# Patient Record
Sex: MALE | Race: BLACK OR AFRICAN AMERICAN | URBAN - METROPOLITAN AREA
[De-identification: names, ages, dates, MRNs, and addresses within clinical notes are randomized per-mention and may not be internally consistent; named-entity substitution may affect disease eponyms.]

---

## 2018-05-30 ENCOUNTER — OFFICE VISIT (OUTPATIENT)
Dept: FAMILY MEDICINE CLINIC | Age: 48
End: 2018-05-30

## 2018-05-30 VITALS
WEIGHT: 187.6 LBS | TEMPERATURE: 98.3 F | SYSTOLIC BLOOD PRESSURE: 130 MMHG | HEART RATE: 82 BPM | BODY MASS INDEX: 26.86 KG/M2 | OXYGEN SATURATION: 98 % | RESPIRATION RATE: 18 BRPM | HEIGHT: 70 IN | DIASTOLIC BLOOD PRESSURE: 86 MMHG

## 2018-05-30 DIAGNOSIS — E66.3 OVERWEIGHT: ICD-10-CM

## 2018-05-30 DIAGNOSIS — Z76.89 ENCOUNTER TO ESTABLISH CARE: Primary | ICD-10-CM

## 2018-05-30 DIAGNOSIS — Z00.00 ROUTINE GENERAL MEDICAL EXAMINATION AT HEALTH CARE FACILITY: ICD-10-CM

## 2018-05-30 DIAGNOSIS — R73.03 PREDIABETES: ICD-10-CM

## 2018-05-30 DIAGNOSIS — Z23 ENCOUNTER FOR IMMUNIZATION: ICD-10-CM

## 2018-05-30 NOTE — PROGRESS NOTES
Chief Complaint   Patient presents with    New Patient     1. Have you been to the ER, urgent care clinic since your last visit? Hospitalized since your last visit? No    2. Have you seen or consulted any other health care providers outside of the [address] [place] [address] since your last visit? Include any pap smears or colon screening. No    Patient received TDAP vaccine today in right deltoid with no adverse reactions.

## 2018-05-30 NOTE — PROGRESS NOTES
[facility] Note      Subjective:     Chief Complaint   Patient presents with    New Patient     Bridget Bahena is a 50y.o. year old male who presents for evaluation of the following:    Establishment of Care:  Previous PCP: NP [facility] [place]:   Dentist- none  Optho- none    PMH:   Prediabetes: A1C 5.7% in 2014  Patein was unaware of this lab abnormality. Mother with diabetes    Acute Concerns:  None    Social:   Works Shredded, shred personal documents  Lives alone  Children, 7 who live locally and in good relationship with them  Mood is content    Health Maintenance:   Diet: unrestricted  Activity: Gym once per week, for 1.5 hours, weight training, stair climber    TDaP: Not on file. Due  Influenza: Not due  Pneumovax: Not due   Prevnar @65: Not due  Shingles @60: Not due    Colonoscopy @50: No family history, Not due. ASA @ 55f and 45m: Not taking  Dexa @65: Not due    HIV or other STD testing: Due  Domestic Violence Screen: Negative  Depression Screen: Denies depression or anhedonia        reports that he currently engages in sexual activity and has had female partners. Prostate Check: Last 2014  No Fam Hx of prostate cancer  Denies groin pain/pulling, penile bleeding/discharge, dysuria, nighttime urination. Smoker? No      Review of Systems   Pertinent positives and negative per HPI. All other systems  reviewed are negative for a Comprehensive ROS (10+). History reviewed. No pertinent past medical history. Social History     Social History    Marital status:      Spouse name: N/A    Number of children: N/A    Years of education: N/A     Occupational History    Not on file.      Social History Main Topics    Smoking status: Never Smoker    Smokeless tobacco: Never Used    Alcohol use Yes      Comment: socially    Drug use: No    Sexual activity: Yes     Partners: Female     Other Topics Concern    Not on file     Social History Narrative No current outpatient prescriptions on file. No current facility-administered medications for this visit. Objective:     Vitals:    05/30/18 0823   BP: 130/86   Pulse: 82   Resp: 18   Temp: 98.3 °F (36.8 °C)   TempSrc: Oral   SpO2: 98%   Weight: 187 lb 9.6 oz (85.1 kg)   Height: 5' 10\" (1.778 m)       Physical Examination:  General: Alert, cooperative, no distress, appears stated age. Eyes: Conjunctivae clear. PERRL, EOMs intact. Ears: Normal external ear canals both ears. Nose: Nares normal. Septum midline. Mucosa normal. No drainage or sinus tenderness. Mouth/Throat: Lips, mucosa, and tongue normal. Teeth and gums normal.  Neck: Supple, symmetrical, trachea midline, no adenopathy. No thyroid enlargement/tenderness/nodules  Back: Symmetric, no curvature. ROM normal.   Lungs: Clear to auscultation bilaterally. Normal inspiratory and expiratory ratio. Heart: Regular rate and rhythm, S1, S2 normal, no murmur, click, rub or gallop. Abdomen: Soft, non-tender. Bowel sounds normal. Small umbilical hernia, fully reducible. Extremities: Extremities normal, atraumatic, no cyanosis or edema. Pulses: 2+ and symmetric all extremities. Skin: Skin color, texture, turgor normal. No rashes or lesions on exposed skin. Lymph nodes: Cervical, supraclavicular nodes normal.  Neurologic: CNII-XII intact. Strength 5/5 grossly. Sensation and reflexes normal throughout. No visits with results within 3 Month(s) from this visit.   Latest known visit with results is:    Office Visit on 02/10/2014   Component Date Value Ref Range Status    Glucose 02/10/2014 78  65 - 99 mg/dL Final    BUN 02/10/2014 12  6 - 24 mg/dL Final    Creatinine 02/10/2014 1.28* 0.76 - 1.27 mg/dL Final    GFR est non-AA 02/10/2014 68  >59 mL/min/1.73 Final    GFR est AA 02/10/2014 78  >59 mL/min/1.73 Final    BUN/Creatinine ratio 02/10/2014 9  9 - 20 Final    Sodium 02/10/2014 144  134 - 144 mmol/L Final    Potassium 02/10/2014 3.9  3.5 - 5.2 mmol/L Final    Chloride 02/10/2014 103  97 - 108 mmol/L Final    CO2 02/10/2014 22  19 - 28 mmol/L Final    Calcium 02/10/2014 10.1  8.7 - 10.2 mg/dL Final    Protein, total 02/10/2014 6.9  6.0 - 8.5 g/dL Final    Albumin 02/10/2014 4.5  3.5 - 5.5 g/dL Final    GLOBULIN, TOTAL 02/10/2014 2.4  1.5 - 4.5 g/dL Final    A-G Ratio 02/10/2014 1.9  1.1 - 2.5 Final    Bilirubin, total 02/10/2014 0.6  0.0 - 1.2 mg/dL Final    Alk. phosphatase 02/10/2014 52  39 - 117 IU/L Final    AST (SGOT) 02/10/2014 15  0 - 40 IU/L Final    ALT (SGPT) 02/10/2014 14  0 - 44 IU/L Final    Cholesterol, total 02/10/2014 164  100 - 199 mg/dL Final    Triglyceride 02/10/2014 66  0 - 149 mg/dL Final    HDL Cholesterol 02/10/2014 49  >39 mg/dL Final    Comment: According to ATP-III Guidelines, HDL-C >59 mg/dL is considered a                           negative risk factor for CHD.  VLDL, calculated 02/10/2014 13  5 - 40 mg/dL Final    LDL, calculated 02/10/2014 102* 0 - 99 mg/dL Final    Prostate Specific Ag 02/10/2014 0.9  0.0 - 4.0 ng/mL Final    Comment: Roche ECLIA methodology. According to the American Urological Association, Serum PSA should                           decrease and remain at undetectable levels after radical                           prostatectomy. The AUA defines biochemical recurrence as an initial                           PSA value 0.2 ng/mL or greater followed by a subsequent confirmatory                           PSA value 0.2 ng/mL or greater. Values obtained with different assay methods or kits cannot be used                           interchangeably. Results cannot be interpreted as absolute evidence                           of the presence or absence of malignant disease.     Hemoglobin A1c 02/10/2014 5.7* 4.8 - 5.6 % Final    Comment:          Increased risk for diabetes: 5.7 - 6.4                                    Diabetes: >6.4 Glycemic control for adults with diabetes: <7.0    INTERPRETATION 02/10/2014 Note   Final    Comment: Medical Director's Note: On November 12, 2013, the ACC/AHA released                           new guidelines on cholesterol treatment in adults ([address] et al.                           2013 ACC/AHA guideline on the treatment of blood cholesterol to reduce                           atherosclerotic cardiovascular risk in adults: a report of the                           [employer] of Cardiology/American Heart Association Task Force                           on Practice Guidelines. Circulation. 2013; 00:000-000.) The new                           guidelines do not make a recommendation for or against use of LDL-C or                           non-HDL-C targets, but instead identify patient groups who would                           benefit from statin therapy. Until a consensus has been reached, the                           suggestions in this report continue to reflect the ATP III guidelines.  PDF IMAGE 02/10/2014 . Final    Comment: Performed At: [address]  [id number]                           Cordell Garces MD                           [id number]                           Performed At: [address] [place] [address]  [id number]                           Halie Velazquez PhD                           [phone]           Assessment/ Plan:   Diagnoses and all orders for this visit:    1. Encounter to establish care    2.  Routine general medical examination at health care facility  -     LIPID PANEL  -     METABOLIC PANEL, COMPREHENSIVE  -     CBC WITH AUTOMATED DIFF  -     HIV 1/2 AG/AB, 4TH GENERATION,W RFLX CONFIRM  -     RPR  - CT/NG/T.VAGINALIS AMPLIFICATION  -     Tetanus, diphtheria toxoids and acellular pertussis (TDAP) vaccine, in individuals >=7 years, IM  -     ND IMMUNIZ ADMIN,1 SINGLE/COMB VAC/TOXOID    3. Prediabetes  -     HEMOGLOBIN A1C WITH EAG    4. Overweight    5. Encounter for immunization  -     Tetanus, diphtheria toxoids and acellular pertussis (TDAP) vaccine, in individuals >=7 years, IM  -     ND IMMUNIZ ADMIN,1 SINGLE/COMB VAC/TOXOID      Available EPIC records reviewed briefly. Doing well overall  Abnormal findings discussed below. STI screen done  Labs to eval end organ function today  TDap given, otherwise vaccines up to date    History of prediabetes. Monitor with labs. Diet and lifestyle modification encouraged for weight loss and avoiding weight gain. I have discussed the diagnosis with the patient and the intended plan as seen in the above orders. The patient has received an after-visit summary and questions were answered concerning future plans. I have discussed medication side effects and warnings with the patient as well.            Follow-up Disposition:  Return in about 1 year (around 5/30/2019) for Physical exam.      Signed,    Rajesh Meier MD  5/30/2018

## 2018-05-30 NOTE — PATIENT INSTRUCTIONS

## 2018-05-30 NOTE — MR AVS SNAPSHOT
[address] 
[phone] Patient: Av Bustamante MRN: [id number] RFG:[id number]/[id number] Visit Information Date & Time Provider Department Dept. Phone Encounter #  
 5/30/2018  8:30 AM Tahmina Li MD [address] [place] [address] [phone] [id number] Follow-up Instructions Return in about 1 year (around 5/30/2019) for Physical exam. Upcoming Health Maintenance Date Due DTaP/Tdap/Td series (1 - Tdap) 1/10/1991 Influenza Age 5 to Adult 8/1/2018 Allergies as of 5/30/2018  Review Complete On: 5/30/2018 By: Tahmina Li MD  
 No Known Allergies Current Immunizations  Never Reviewed No immunizations on file. Not reviewed this visit You Were Diagnosed With   
  
 Codes Comments Encounter to establish care    -  Primary ICD-10-CM: Z76.89 
ICD-9-CM: V65.8 Routine general medical examination at health care facility     ICD-10-CM: Z00.00 ICD-9-CM: V70.0 Prediabetes     ICD-10-CM: R73.03 
ICD-9-CM: 790.29 Vitals BP Pulse Temp Resp Height(growth percentile) Weight(growth percentile) 130/86 (BP 1 Location: Left arm, BP Patient Position: Sitting) 82 98.3 °F (36.8 °C) (Oral) 18 5' 10\" (1.778 m) 187 lb 9.6 oz (85.1 kg) SpO2 BMI Smoking Status 98% 26.92 kg/m2 Never Smoker BMI and BSA Data Body Mass Index Body Surface Area  
 26.92 kg/m 2 2.05 m 2 Preferred Pharmacy Pharmacy Name Phone [id number], [address] [facility] [place] [phone] Your Updated Medication List  
  
Notice  As of 5/30/2018  9:05 AM  
 You have not been prescribed any medications. We Performed the Following CBC WITH AUTOMATED DIFF [21550 CPT(R)] CT/NG/T.VAGINALIS AMPLIFICATION [id number] CPT(R)] HEMOGLOBIN A1C WITH EAG [82760 CPT(R)] HIV 1/2 AG/AB, 4TH GENERATION,W RFLX CONFIRM [id number] CPT(R)] LIPID PANEL [87184 CPT(R)] METABOLIC PANEL, COMPREHENSIVE [25879 CPT(R)] RPR [69687 CPT(R)] Follow-up Instructions Return in about 1 year (around 5/30/2019) for Physical exam.  
  
  
Patient Instructions Well Visit, Ages 25 to 48: Care Instructions Your Care Instructions Physical exams can help you stay healthy. Your doctor has checked your overall health and may have suggested ways to take good care of yourself. He or she also may have recommended tests. At home, you can help prevent illness with healthy eating, regular exercise, and other steps. Follow-up care is a key part of your treatment and safety. Be sure to make and go to all appointments, and call your doctor if you are having problems. It's also a good idea to know your test results and keep a list of the medicines you take. How can you care for yourself at home? · Reach and stay at a healthy weight. This will lower your risk for many problems, such as obesity, diabetes, heart disease, and high blood pressure. · Get at least 30 minutes of physical activity on most days of the week. Walking is a good choice. You also may want to do other activities, such as running, swimming, cycling, or playing tennis or team sports. Discuss any changes in your exercise program with your doctor. · Do not smoke or allow others to smoke around you. If you need help quitting, talk to your doctor about stop-smoking programs and medicines. These can increase your chances of quitting for good. · Talk to your doctor about whether you have any risk factors for sexually transmitted infections (STIs). Having one sex partner (who does not have STIs and does not have sex with anyone else) is a good way to avoid these infections. · Use birth control if you do not want to have children at this time. Talk with your doctor about the choices available and what might be best for you. · Protect your skin from too much sun. When you're outdoors from 10 a.m. to 4 p.m., stay in the shade or cover up with clothing and a hat with a wide brim. Wear sunglasses that block UV rays. Even when it's cloudy, put broad-spectrum sunscreen (SPF 30 or higher) on any exposed skin. · See a dentist one or two times a year for checkups and to have your teeth cleaned. · Wear a seat belt in the car. · Drink alcohol in moderation, if at all. That means no more than 2 drinks a day for men and 1 drink a day for women. Follow your doctor's advice about when to have certain tests. These tests can spot problems early. For everyone · Cholesterol. Have the fat (cholesterol) in your blood tested after age 21. Your doctor will tell you how often to have this done based on your age, family history, or other things that can increase your risk for heart disease. · Blood pressure. Have your blood pressure checked during a routine doctor visit. Your doctor will tell you how often to check your blood pressure based on your age, your blood pressure results, and other factors. · Vision. Talk with your doctor about how often to have a glaucoma test. 
· Diabetes. Ask your doctor whether you should have tests for diabetes. · Colon cancer. Have a test for colon cancer at age 48. You may have one of several tests. If you are younger than 48, you may need a test earlier if you have any risk factors. Risk factors include whether you already had a precancerous polyp removed from your colon or whether your parent, brother, sister, or child has had colon cancer. For women · Breast exam and mammogram. Talk to your doctor about when you should have a clinical breast exam and a mammogram. Medical experts differ on whether and how often women under 50 should have these tests. Your doctor can help you decide what is right for you. · Pap test and pelvic exam. Begin Pap tests at age 24.  A Pap test is the best way to find cervical cancer. The test often is part of a pelvic exam. Ask how often to have this test. 
· Tests for sexually transmitted infections (STIs). Ask whether you should have tests for STIs. You may be at risk if you have sex with more than one person, especially if your partners do not wear condoms. For men · Tests for sexually transmitted infections (STIs). Ask whether you should have tests for STIs. You may be at risk if you have sex with more than one person, especially if you do not wear a condom. · Testicular cancer exam. Ask your doctor whether you should check your testicles regularly. · Prostate exam. Talk to your doctor about whether you should have a blood test (called a PSA test) for prostate cancer. Experts differ on whether and when men should have this test. Some experts suggest it if you are older than 39 and are -American or have a father or brother who got prostate cancer when he was younger than 72. When should you call for help? Watch closely for changes in your health, and be sure to contact your doctor if you have any problems or symptoms that concern you. Where can you learn more? Go to http://morris-larry.info/. Enter P072 in the search box to learn more about \"Well Visit, Ages 25 to 48: Care Instructions. \" Current as of: May 12, 2017 Content Version: 11.4 © 3246-5232 Healthwise, Incorporated. Care instructions adapted under license by [employer] (which disclaims liability or warranty for this information). If you have questions about a medical condition or this instruction, always ask your healthcare professional. [place] [address] any warranty or liability for your use of this information. Introducing \[facility]\"" & HEALTH SERVICES! Deaandrea Garcia Serve: Thank you for requesting a [employer] account. Our records indicate that you already have an active [employer] account.   You can access your account anytime at [email or web address] Did you know that you can access your hospital and ER discharge instructions at any time in [employer]? You can also review all of your test results from your hospital stay or ER visit. Additional Information If you have questions, please visit the Frequently Asked Questions section of the [employer] website at [email or web address] Remember, [employer] is NOT to be used for urgent needs. For medical emergencies, dial 911. Now available from your iPhone and Android! Please provide this summary of care documentation to your next provider. If you have any questions after today's visit, please call [phone].

## 2018-05-31 LAB
ALBUMIN SERPL-MCNC: 4.8 G/DL (ref 3.5–5.5)
ALBUMIN/GLOB SERPL: 1.9 {RATIO} (ref 1.2–2.2)
ALP SERPL-CCNC: 56 IU/L (ref 39–117)
ALT SERPL-CCNC: 18 IU/L (ref 0–44)
AST SERPL-CCNC: 19 IU/L (ref 0–40)
BASOPHILS # BLD AUTO: 0 X10E3/UL (ref 0–0.2)
BASOPHILS NFR BLD AUTO: 0 %
BILIRUB SERPL-MCNC: 0.4 MG/DL (ref 0–1.2)
BUN SERPL-MCNC: 17 MG/DL (ref 6–24)
BUN/CREAT SERPL: 12 (ref 9–20)
C TRACH RRNA SPEC QL NAA+PROBE: NEGATIVE
CALCIUM SERPL-MCNC: 10.3 MG/DL (ref 8.7–10.2)
CHLORIDE SERPL-SCNC: 102 MMOL/L (ref 96–106)
CHOLEST SERPL-MCNC: 169 MG/DL (ref 100–199)
CO2 SERPL-SCNC: 25 MMOL/L (ref 18–29)
CREAT SERPL-MCNC: 1.38 MG/DL (ref 0.76–1.27)
EOSINOPHIL # BLD AUTO: 0.1 X10E3/UL (ref 0–0.4)
EOSINOPHIL NFR BLD AUTO: 1 %
ERYTHROCYTE [DISTWIDTH] IN BLOOD BY AUTOMATED COUNT: 14.1 % (ref 12.3–15.4)
EST. AVERAGE GLUCOSE BLD GHB EST-MCNC: 111 MG/DL
GFR SERPLBLD CREATININE-BSD FMLA CKD-EPI: 60 ML/MIN/1.73
GFR SERPLBLD CREATININE-BSD FMLA CKD-EPI: 69 ML/MIN/1.73
GLOBULIN SER CALC-MCNC: 2.5 G/DL (ref 1.5–4.5)
GLUCOSE SERPL-MCNC: 89 MG/DL (ref 65–99)
HBA1C MFR BLD: 5.5 % (ref 4.8–5.6)
HCT VFR BLD AUTO: 48.4 % (ref 37.5–51)
HDLC SERPL-MCNC: 48 MG/DL
HGB BLD-MCNC: 16.3 G/DL (ref 13–17.7)
HIV 1+2 AB+HIV1 P24 AG SERPL QL IA: NON REACTIVE
IMM GRANULOCYTES # BLD: 0 X10E3/UL (ref 0–0.1)
IMM GRANULOCYTES NFR BLD: 0 %
INTERPRETATION, 910389: NORMAL
LDLC SERPL CALC-MCNC: 102 MG/DL (ref 0–99)
LYMPHOCYTES # BLD AUTO: 2.2 X10E3/UL (ref 0.7–3.1)
LYMPHOCYTES NFR BLD AUTO: 27 %
MCH RBC QN AUTO: 29.2 PG (ref 26.6–33)
MCHC RBC AUTO-ENTMCNC: 33.7 G/DL (ref 31.5–35.7)
MCV RBC AUTO: 87 FL (ref 79–97)
MONOCYTES # BLD AUTO: 0.5 X10E3/UL (ref 0.1–0.9)
MONOCYTES NFR BLD AUTO: 6 %
N GONORRHOEA RRNA SPEC QL NAA+PROBE: NEGATIVE
NEUTROPHILS # BLD AUTO: 5.3 X10E3/UL (ref 1.4–7)
NEUTROPHILS NFR BLD AUTO: 66 %
PLATELET # BLD AUTO: 191 X10E3/UL (ref 150–379)
POTASSIUM SERPL-SCNC: 4.5 MMOL/L (ref 3.5–5.2)
PROT SERPL-MCNC: 7.3 G/DL (ref 6–8.5)
RBC # BLD AUTO: 5.58 X10E6/UL (ref 4.14–5.8)
RPR SER QL: NON REACTIVE
SODIUM SERPL-SCNC: 141 MMOL/L (ref 134–144)
T VAGINALIS RRNA SPEC QL NAA+PROBE: NEGATIVE
TRIGL SERPL-MCNC: 97 MG/DL (ref 0–149)
VLDLC SERPL CALC-MCNC: 19 MG/DL (ref 5–40)
WBC # BLD AUTO: 8.1 X10E3/UL (ref 3.4–10.8)

## 2018-06-01 NOTE — PROGRESS NOTES
Notify patient:    Most of your test results are normal.  All STD testing was normal. One cholesterol level was higher than normal.  To improve this I suggest avoiding foods high in saturated fat such as processed meats, fried foods, and greasy snacks. Your creatinine level was slightly higher than normal.  This is a test for your kidney function, which has also been a little higher than normal for years ago. We will continue to monitor this. I would like to recheck this in 6 months. Please follow-up in office for this. Return to clinic sooner if you develop any change in the amount of urine you produce or blood in urine.